# Patient Record
Sex: MALE | Race: BLACK OR AFRICAN AMERICAN | NOT HISPANIC OR LATINO | Employment: OTHER | URBAN - METROPOLITAN AREA
[De-identification: names, ages, dates, MRNs, and addresses within clinical notes are randomized per-mention and may not be internally consistent; named-entity substitution may affect disease eponyms.]

---

## 2017-05-22 DIAGNOSIS — M17.0 PRIMARY OSTEOARTHRITIS OF BOTH KNEES: Chronic | ICD-10-CM

## 2017-05-22 DIAGNOSIS — G62.1 NEUROPATHY, ALCOHOLIC: ICD-10-CM

## 2017-05-22 RX ORDER — GABAPENTIN 300 MG/1
300 CAPSULE ORAL 3 TIMES DAILY
Qty: 90 CAPSULE | Refills: 0 | Status: SHIPPED | OUTPATIENT
Start: 2017-05-22 | End: 2019-07-05 | Stop reason: SDUPTHER

## 2017-05-22 RX ORDER — TRAZODONE HYDROCHLORIDE 50 MG/1
50 TABLET ORAL NIGHTLY
Qty: 30 TABLET | Refills: 0 | Status: SHIPPED | OUTPATIENT
Start: 2017-05-22 | End: 2019-07-05

## 2017-05-22 NOTE — TELEPHONE ENCOUNTER
----- Message from Beverley Espinoza sent at 5/22/2017 11:18 AM CDT -----  Contact: self  Pt is calling to get refills on all medication.      Pharmacy  [facility] [address] [place], [place] [address] - [address] AT [facility] [address]    Pt can be reached at [phone]

## 2017-07-01 DIAGNOSIS — A60.00 HERPES GENITALIS: ICD-10-CM

## 2017-07-01 NOTE — LETTER
July 3, 2017    Shemar Sauer  [address] [place] [id number]             [facility]  [address]  [place] [address]  Phone: [phone]  Fax: [phone] Dear Mr. Sauer:    On review of your chart, it looks like it has been more than a year since your last primary care visit. For your safety, ongoing medication refills cannot be given without regular visits at least once a year (or more often, depending on the medication).     Your health and follow-up medical care are important to us. Please call our office as soon as possible so that we may reschedule your appointment. If you have already rescheduled your appointment, please disregard this letter.    Sincerely,        Avery Mendoza MD   [facility]

## 2017-07-03 RX ORDER — VALACYCLOVIR HYDROCHLORIDE 500 MG/1
TABLET, FILM COATED ORAL
Qty: 90 TABLET | Refills: 0 | OUTPATIENT
Start: 2017-07-03

## 2017-07-03 NOTE — TELEPHONE ENCOUNTER
Patient not seen in >1 year. Needs to schedule appt before medication refill can be given, please call to let him know and help schedule appt.

## 2018-02-01 DIAGNOSIS — A60.00 HERPES GENITALIS: ICD-10-CM

## 2018-02-02 RX ORDER — VALACYCLOVIR HYDROCHLORIDE 500 MG/1
TABLET, FILM COATED ORAL
Qty: 90 TABLET | Refills: 0 | OUTPATIENT
Start: 2018-02-02

## 2018-02-02 NOTE — TELEPHONE ENCOUNTER
Reviewed, agree with this recommendation. Patient needs to be seen; for urgent issues can be seen for urgent care visit.

## 2018-02-02 NOTE — TELEPHONE ENCOUNTER
Spoke with brother of pt. I informed him the pt needs to be seen for an evaluation before the Rx could be filled. And the  Suggest he make an urgent care oliver.  He already has an appointment for April, and will try to make it then.     Sade'

## 2018-02-02 NOTE — TELEPHONE ENCOUNTER
Pt has lost a lot of weight, brother states that pt has been without his medicine but not sure for how long. Pt's brother states he has not seen his brother in 2 years, pt's brother noticed that pt is very thin, and has breakouts all over his body. Brother reports that pt has started back drinking.Pt has a scheduled annual. Please advise if Rx can be sent in.

## 2018-02-02 NOTE — TELEPHONE ENCOUNTER
It is unclear if the breakouts described will be treated by the requested medication.  Patient needs an evaluation, please schedule for urgent care visit.

## 2018-02-02 NOTE — TELEPHONE ENCOUNTER
----- Message from Dorota Julian sent at 2/2/2018  8:48 AM CST -----  Contact: Brother/Humberto [phone]  Prescription Request:     Name of medication: valacyclovir (VALTREX) 500 MG tablet    Reason for request: Refill    Pharmacy: [facility] [address] [place] [address] - [address] AT [facility] [address]    Brother is requesting to speak to you.    Thank You

## 2018-02-02 NOTE — TELEPHONE ENCOUNTER
Pt is requesting Rx refill. Brother also asked to speak with you. I called and tried to reach the brother, brittanie, and had to leave a message for him to return the call.    Please advise,    Thanks Maura

## 2019-03-01 ENCOUNTER — TELEPHONE (OUTPATIENT)
Dept: INTERNAL MEDICINE | Facility: CLINIC | Age: 44
End: 2019-03-01

## 2019-03-01 DIAGNOSIS — A60.00 HERPES GENITALIS: ICD-10-CM

## 2019-03-01 RX ORDER — VALACYCLOVIR HYDROCHLORIDE 500 MG/1
500 TABLET, FILM COATED ORAL DAILY
Qty: 90 TABLET | Refills: 1 | OUTPATIENT
Start: 2019-03-01

## 2019-03-01 NOTE — TELEPHONE ENCOUNTER
----- Message from Beverly Rea sent at 3/1/2019  1:06 PM CST -----  Contact: pt brother janett [phone]   Pt calling to get a refill on medication for herpes flare ups. He does not know the name of the medication. Please advise.

## 2019-03-01 NOTE — TELEPHONE ENCOUNTER
Patient hasn't been seen in >2 years. No show to last 5 scheduled appointments. Not appropriate to send in refill at this time; if he is having an active outbreak he should schedule for urgent visit for evaluation.     Please check with patient to confirm that he plans to continue to follow with me for primary care, and schedule appt if so. If not, please update PCP field in Epic.

## 2019-04-02 ENCOUNTER — TELEPHONE (OUTPATIENT)
Dept: INTERNAL MEDICINE | Facility: CLINIC | Age: 44
End: 2019-04-02

## 2019-04-02 NOTE — TELEPHONE ENCOUNTER
----- Message from Jimena Carpio sent at 4/2/2019 10:08 AM CDT -----  Contact: [phone]  Patient is requesting a call from the office. Patient needs a refill on his medications. He stated he needs all of them he doesn't know the names right now. He stated he doesn't have transportation to get to his appointments.     Please advise, thanks

## 2019-05-17 ENCOUNTER — TELEPHONE (OUTPATIENT)
Dept: INTERNAL MEDICINE | Facility: CLINIC | Age: 44
End: 2019-05-17

## 2019-05-17 NOTE — TELEPHONE ENCOUNTER
----- Message from Santa Lim sent at 5/17/2019 10:56 AM CDT -----  Contact: self/[phone]  .1 Patient would like to get medical advice.  Symptoms (please be specific): Legs problems  How long has patient had these symptoms:4 months ago   Pharmacy name and phone#:[facility] [address] [place] [address] [address] AT [facility] [address] [phone] (Phone)  [phone] (Fax)  Any drug allergies: no  Comments: Patient is asking to be seen by PCP,but next available appointment is not until 08/19. Patient would like to get medical advice.

## 2019-05-17 NOTE — TELEPHONE ENCOUNTER
----- Message from Vinita House sent at 5/17/2019 11:23 AM CDT -----  Contact: Patient [phone]  Type: Returning a call    Who left a message?Zulma Heard MA      When did the practice call? Today-05/17/19    Comments:Please call back.      Thanks

## 2019-05-17 NOTE — TELEPHONE ENCOUNTER
Returned call, no answer, left message.  Please schedule pt an appointment for first available same day visit with anyone.

## 2019-05-20 ENCOUNTER — TELEPHONE (OUTPATIENT)
Dept: INTERNAL MEDICINE | Facility: CLINIC | Age: 44
End: 2019-05-20

## 2019-05-20 NOTE — TELEPHONE ENCOUNTER
----- Message from Arielle Osorio sent at 5/20/2019 12:29 PM CDT -----  Contact: self  Pt is calling in regards to scheduling an appt for med refills and a checkup. The first available appt is 08/08. Pt would like to be seen sooner because its been a while since he has been seen.    He can be reached at  [phone].    Thank you

## 2019-06-14 ENCOUNTER — TELEPHONE (OUTPATIENT)
Dept: INTERNAL MEDICINE | Facility: CLINIC | Age: 44
End: 2019-06-14

## 2019-06-14 NOTE — TELEPHONE ENCOUNTER
----- Message from Graciela Joiner sent at 6/13/2019  6:24 PM CDT -----  Contact: Pt  Pt is requesting an appt due to Annual. Patient stated he missed his previous scheduled appointment due to transportation issues.     Patient can be reached at [phone] (louis)

## 2019-06-14 NOTE — TELEPHONE ENCOUNTER
Unable to book appointment at this time   Gave the medicaid number [phone] and they may get him somethng sooner

## 2019-06-24 ENCOUNTER — TELEPHONE (OUTPATIENT)
Dept: INTERNAL MEDICINE | Facility: CLINIC | Age: 44
End: 2019-06-24

## 2019-06-24 NOTE — TELEPHONE ENCOUNTER
----- Message from Dorota Julian sent at 6/24/2019  2:41 PM CDT -----  Contact: Patient [phone]  Requesting an earlier appt date or time    Next available appt: None    Nature of the appt: Annual Physical    Does patient have appt scheduled?: No    Please contact patient to schedule earlier appt if available but notify patient either way.    Thank You

## 2019-06-24 NOTE — TELEPHONE ENCOUNTER
----- Message from Jimena Carpio sent at 6/24/2019  1:21 PM CDT -----  Contact: [phone]  Caller is requesting a sooner appointment. Caller declined first available appointment listed below. Caller will not accept being placed on the wait list and is requesting a message be sent to the provider.    When is the next available appointment:    Did you offer to schedule the next available appt and put the patient on the wait list?:     What visit type: same day  Symptoms:  Both legs pain  Patient preference of timeframe to be scheduled: asap   What is the reason the patient is requesting a sooner appointment? (insurance terminating, changing jobs):    Would the patient rather a call back or a response via [facility]?:  Call back  Comments:  Please advise, thanks

## 2019-07-05 ENCOUNTER — OFFICE VISIT (OUTPATIENT)
Dept: INTERNAL MEDICINE | Facility: CLINIC | Age: 44
End: 2019-07-05
Payer: MEDICAID

## 2019-07-05 VITALS
OXYGEN SATURATION: 97 % | HEART RATE: 107 BPM | DIASTOLIC BLOOD PRESSURE: 75 MMHG | SYSTOLIC BLOOD PRESSURE: 130 MMHG | HEIGHT: 69 IN | WEIGHT: 132.5 LBS | BODY MASS INDEX: 19.62 KG/M2

## 2019-07-05 DIAGNOSIS — M17.0 PRIMARY OSTEOARTHRITIS OF BOTH KNEES: Chronic | ICD-10-CM

## 2019-07-05 DIAGNOSIS — F10.288 ALCOHOL DEPENDENCE WITH OTHER ALCOHOL-INDUCED DISORDER: ICD-10-CM

## 2019-07-05 DIAGNOSIS — G62.1 NEUROPATHY, ALCOHOLIC: Primary | ICD-10-CM

## 2019-07-05 DIAGNOSIS — E55.9 VITAMIN D DEFICIENCY: ICD-10-CM

## 2019-07-05 DIAGNOSIS — B00.9 HSV INFECTION: ICD-10-CM

## 2019-07-05 DIAGNOSIS — H35.52 RETINITIS PIGMENTOSA: ICD-10-CM

## 2019-07-05 DIAGNOSIS — A60.00 HERPES GENITALIS: ICD-10-CM

## 2019-07-05 LAB
C TRACH DNA SPEC QL NAA+PROBE: NOT DETECTED
N GONORRHOEA DNA SPEC QL NAA+PROBE: NOT DETECTED

## 2019-07-05 PROCEDURE — 87491 CHLMYD TRACH DNA AMP PROBE: CPT

## 2019-07-05 PROCEDURE — 99215 OFFICE O/P EST HI 40 MIN: CPT | Mod: PBBFAC,25 | Performed by: STUDENT IN AN ORGANIZED HEALTH CARE EDUCATION/TRAINING PROGRAM

## 2019-07-05 PROCEDURE — 99999 PR PBB SHADOW E&M-EST. PATIENT-LVL V: ICD-10-PCS | Mod: PBBFAC,,, | Performed by: STUDENT IN AN ORGANIZED HEALTH CARE EDUCATION/TRAINING PROGRAM

## 2019-07-05 PROCEDURE — 99204 OFFICE O/P NEW MOD 45 MIN: CPT | Mod: S$PBB,,, | Performed by: STUDENT IN AN ORGANIZED HEALTH CARE EDUCATION/TRAINING PROGRAM

## 2019-07-05 PROCEDURE — 99204 PR OFFICE/OUTPT VISIT, NEW, LEVL IV, 45-59 MIN: ICD-10-PCS | Mod: S$PBB,,, | Performed by: STUDENT IN AN ORGANIZED HEALTH CARE EDUCATION/TRAINING PROGRAM

## 2019-07-05 PROCEDURE — 99999 PR PBB SHADOW E&M-EST. PATIENT-LVL V: CPT | Mod: PBBFAC,,, | Performed by: STUDENT IN AN ORGANIZED HEALTH CARE EDUCATION/TRAINING PROGRAM

## 2019-07-05 PROCEDURE — 96372 THER/PROPH/DIAG INJ SC/IM: CPT | Mod: PBBFAC

## 2019-07-05 RX ORDER — GABAPENTIN 300 MG/1
300 CAPSULE ORAL 3 TIMES DAILY
Qty: 90 CAPSULE | Refills: 0 | Status: SHIPPED | OUTPATIENT
Start: 2019-07-05

## 2019-07-05 RX ORDER — VALACYCLOVIR HYDROCHLORIDE 500 MG/1
500 TABLET, FILM COATED ORAL DAILY
Qty: 90 TABLET | Refills: 1 | Status: SHIPPED | OUTPATIENT
Start: 2019-07-05

## 2019-07-05 RX ORDER — LANOLIN ALCOHOL/MO/W.PET/CERES
100 CREAM (GRAM) TOPICAL DAILY
Qty: 30 TABLET | Refills: 3 | Status: SHIPPED | OUTPATIENT
Start: 2019-07-05

## 2019-07-05 RX ORDER — ERGOCALCIFEROL 1.25 MG/1
50000 CAPSULE ORAL
Qty: 12 CAPSULE | Refills: 0 | Status: SHIPPED | OUTPATIENT
Start: 2019-07-05

## 2019-07-05 RX ORDER — KETOROLAC TROMETHAMINE 30 MG/ML
60 INJECTION, SOLUTION INTRAMUSCULAR; INTRAVENOUS ONCE
Status: COMPLETED | OUTPATIENT
Start: 2019-07-05 | End: 2019-07-05

## 2019-07-05 RX ADMIN — KETOROLAC TROMETHAMINE 60 MG: 60 INJECTION, SOLUTION INTRAMUSCULAR at 03:07

## 2019-07-05 NOTE — PATIENT INSTRUCTIONS
Follow up with optometrist and ophthalmologist for eye evaluation.     Refilled gabapentin, valtrex, vitamin D, and thiamine.     Follow-up in 3 months or as needed for any new issues.

## 2019-07-05 NOTE — PROGRESS NOTES
"Subjective:       Patient ID: Shemar Sauer is a 43 y.o. male.    Chief Complaint: No chief complaint on file.    Mr. Sauer is a 43yM with pmhx of alcohol use disorder, retinitis pigmentosa, and genital HSV and is coming in today for bilateral leg pain. He was last seen in 2016 and had similar complaints at that time. He reports noncompliance with meds for past year or so due to being homeless and unable to obtain ride to clinic. He has recently been living in an apartment and is here with mother-in-law today. He describes the pain as a burning, "jumping" pain in both legs that he feels was helped by tramadol another physician gave him a few years ago, has not tried any OTC meds to help. He was on Gabapentin in the past and is open to re-starting it today. He denies any recent falls or dizziness. Still drinking heavily, about 3-4 beers/day, reports having "a few drinks" prior to appointment. Denies any drug use.     He is also asking for refills on his Valtrex today as he is reporting recent herpes outbreak, not sexually active in past 4 months but knows importance of condom use.     He has not seen eye doctor in "years" for his retinitis pigmentosa but states he is on disability due to not being able to see.       Social History:       Tobacco Use: Yes      Smoking status: Current, daily smoker        Types: Cigars, Cigarettes ~3/day    Substances: Denies any illicit drug use      Alcohol use: Yes, daily        Alcohol/week: 3-5 beers daily    Review of Systems   Constitutional: Negative for chills and fever.   Eyes: Positive for visual disturbance. Negative for pain.   Respiratory: Negative for cough and shortness of breath.    Cardiovascular: Negative for chest pain.   Gastrointestinal: Negative for abdominal pain.   Genitourinary: Positive for genital sores.   Musculoskeletal: Positive for gait problem and myalgias.   Skin: Negative for rash and wound.   Neurological: Negative for dizziness, seizures and " syncope.   Psychiatric/Behavioral: Negative for confusion. The patient is not nervous/anxious.        Objective:      Physical Exam   Constitutional: He is oriented to person, place, and time.   Thin-appearing AA male   HENT:   Head: Normocephalic and atraumatic.   Mouth/Throat: Oropharynx is clear and moist. No oropharyngeal exudate.   Eyes: EOM and lids are normal. Right conjunctiva is injected. Left conjunctiva is injected.   Some blurriness of bilateral corneas   Neck: Normal range of motion. Neck supple. No tracheal deviation present. No thyromegaly present.   Cardiovascular: Normal rate and regular rhythm.   Pulmonary/Chest: Effort normal and breath sounds normal.   Musculoskeletal: Normal range of motion. He exhibits no tenderness.   Some muscle wasting on inner thighs noted   Lymphadenopathy:     He has no cervical adenopathy.   Neurological: He is alert and oriented to person, place, and time. Coordination abnormal.   Skin: Skin is warm and dry.   Old, 3-5 cm scar on right upper arm, well-healed   Psychiatric: His speech is slurred. He is not actively hallucinating. Cognition and memory are normal.   Vitals reviewed.          Assessment:       Shemar was seen today for annual exam and leg pain.    Diagnoses and all orders for this visit:    Neuropathy, alcoholic  -     thiamine 100 MG tablet; Take 1 tablet (100 mg total) by mouth once daily.  -     gabapentin (NEURONTIN) 300 MG capsule; Take 1 capsule (300 mg total) by mouth 3 (three) times daily.    Alcohol dependence with other alcohol-induced disorder  -     thiamine 100 MG tablet; Take 1 tablet (100 mg total) by mouth once daily.  -     gabapentin (NEURONTIN) 300 MG capsule; Take 1 capsule (300 mg total) by mouth 3 (three) times daily.  -     CBC auto differential; Future  -     Comprehensive metabolic panel; Future  -     VITAMIN B1; Future  -     Vitamin B12; Future  -     Hepatitis panel, acute; Future    Herpes genitalis  -     valACYclovir (VALTREX)  500 MG tablet; Take 1 tablet (500 mg total) by mouth once daily.  -     C. trachomatis/N. gonorrhoeae by [facility]; Urine  -     RPR; Future  -     HIV 1/2 Ag/Ab (4th Gen); Future  -     Herpes simplex type 1&2 IgG,Herpes titer; Future    Vitamin D deficiency  -     VITAMIN D2 50,000 unit capsule; Take 1 capsule (50,000 Units total) by mouth every 7 days.    Primary osteoarthritis of both knees  -     ketorolac injection 60 mg  -     gabapentin (NEURONTIN) 300 MG capsule; Take 1 capsule (300 mg total) by mouth 3 (three) times daily.    Retinitis pigmentosa  -     Ambulatory Referral to Ophthalmology  -     Ambulatory Referral to Optometry        Plan:     Neuropathy, alcoholic  Complaining of bilateral burning leg pain that has been bothering him for years. Stressed need to maintain good hydration with non-alcoholic beverages and healthy diet as well as need to be compliant with vitamins. Will re-order thiamine and gabapentin today.   -     thiamine 100 MG tablet; Take 1 tablet (100 mg total) by mouth once daily.  -     gabapentin (NEURONTIN) 300 MG capsule; Take 1 capsule (300 mg total) by mouth 3 (three) times daily.    Alcohol dependence with other alcohol-induced disorder  Pt is still drinking 3-4 beers daily. He denies history of withdrawals or seizures. Stressed need to cut down on drinking. Re-ordered thiamine today.   -Also ordering labs as pt has not had blood work since 2016.  -     thiamine 100 MG tablet; Take 1 tablet (100 mg total) by mouth once daily.  -     CBC auto differential; Future  -     Comprehensive metabolic panel; Future  -     VITAMIN B1; Future  -     Vitamin B12; Future  -     Hepatitis panel, acute; Future    Herpes genitalis  Will re-order valtrex. Stressed importance of condom use.   Ordering STD testing today as pt has not been tested in many years.  -     valACYclovir (VALTREX) 500 MG tablet; Take 1 tablet (500 mg total) by mouth once daily.  -     C. trachomatis/N. gonorrhoeae  by [facility]; Urine  -     RPR; Future  -     HIV 1/2 Ag/Ab (4th Gen); Future  -     Herpes simplex type 1&2 IgG,Herpes titer; Future    Vitamin D deficiency  -Re-filling today  -     VITAMIN D2 50,000 unit capsule; Take 1 capsule (50,000 Units total) by mouth every 7 days.    Primary osteoarthritis of both knees  Complaining of bilateral leg/knee pain. Will give one dose toradol 60 mg in clinic today and re-starting gabapentin to help.  -     ketorolac injection 60 mg  -     gabapentin (NEURONTIN) 300 MG capsule; Take 1 capsule (300 mg total) by mouth 3 (three) times daily.    Retinitis pigmentosa  -     Ambulatory Referral to Ophthalmology for assessment   -     Ambulatory Referral to Optometry for vision screening       Health Maintenance   -  Tdap Vaccine order given in hand and pt instructed to go to pharmacy to receive it.     RTC in 3 months or earlier as needed.      Nilda Muller MD, PGY-1  [facility]